# Patient Record
Sex: MALE | Race: WHITE | URBAN - METROPOLITAN AREA
[De-identification: names, ages, dates, MRNs, and addresses within clinical notes are randomized per-mention and may not be internally consistent; named-entity substitution may affect disease eponyms.]

---

## 2021-07-16 ENCOUNTER — TELEPHONE ENCOUNTER (OUTPATIENT)
Dept: URBAN - METROPOLITAN AREA CLINIC 23 | Facility: CLINIC | Age: 31
End: 2021-07-16

## 2021-07-19 ENCOUNTER — OFFICE VISIT (OUTPATIENT)
Dept: URBAN - METROPOLITAN AREA CLINIC 84 | Facility: CLINIC | Age: 31
End: 2021-07-19

## 2021-07-20 ENCOUNTER — OFFICE VISIT (OUTPATIENT)
Dept: URBAN - METROPOLITAN AREA CLINIC 25 | Facility: CLINIC | Age: 31
End: 2021-07-20
Payer: COMMERCIAL

## 2021-07-20 ENCOUNTER — WEB ENCOUNTER (OUTPATIENT)
Dept: URBAN - METROPOLITAN AREA CLINIC 25 | Facility: CLINIC | Age: 31
End: 2021-07-20

## 2021-07-20 DIAGNOSIS — R79.89 ELEVATED LFTS: ICD-10-CM

## 2021-07-20 PROCEDURE — 99244 OFF/OP CNSLTJ NEW/EST MOD 40: CPT | Performed by: INTERNAL MEDICINE

## 2021-07-21 ENCOUNTER — LAB OUTSIDE AN ENCOUNTER (OUTPATIENT)
Dept: URBAN - METROPOLITAN AREA CLINIC 84 | Facility: CLINIC | Age: 31
End: 2021-07-21

## 2021-07-22 LAB
ACTIN (SMOOTH MUSCLE) ANTIBODY: 13
ALPHA 2-MACROGLOBULINS, QN: 109
ALPHA-1-ANTITRYPSIN, SERUM: 120
ALT (SGPT) P5P: 137
ANA DIRECT: NEGATIVE
APOLIPOPROTEIN A-1: 121
AST (SGOT) P5P: 41
BILIRUBIN, TOTAL: 0.3
CERULOPLASMIN: 19.4
CHOLESTEROL, TOTAL: 230
COMMENT:: (no result)
DEAMIDATED GLIADIN ABS, IGA: 8
DEAMIDATED GLIADIN ABS, IGG: 5
ENDOMYSIAL ANTIBODY IGA: NEGATIVE
FERRITIN, SERUM: 172
FIBROSIS SCORE: 0.04
FIBROSIS SCORING:: (no result)
FIBROSIS STAGE: (no result)
GGT: 36
GLUCOSE, SERUM: 98
HAPTOGLOBIN: 157
HEIGHT:: 75
IMMUNOGLOBULIN A, QN, SERUM: 224
INTERPRETATIONS:: (no result)
IRON BIND.CAP.(TIBC): 294
IRON SATURATION: 20
IRON: 59
LIMITATIONS:: (no result)
MITOCHONDRIAL (M2) ANTIBODY: <20
NASH GRADE: (no result)
NASH SCORE: 0.5
NASH SCORING: (no result)
STEATOSIS GRADE: (no result)
STEATOSIS GRADING: (no result)
STEATOSIS SCORE: 0.76
T-TRANSGLUTAMINASE (TTG) IGA: <2
T-TRANSGLUTAMINASE (TTG) IGG: 4
T4,FREE(DIRECT): 1.72
TRIGLYCERIDES: 174
TSH: 0.62
UIBC: 235
WEIGHT:: 226

## 2021-07-26 ENCOUNTER — TELEPHONE ENCOUNTER (OUTPATIENT)
Dept: URBAN - METROPOLITAN AREA CLINIC 25 | Facility: CLINIC | Age: 31
End: 2021-07-26

## 2021-08-04 ENCOUNTER — WEB ENCOUNTER (OUTPATIENT)
Dept: URBAN - METROPOLITAN AREA CLINIC 25 | Facility: CLINIC | Age: 31
End: 2021-08-04

## 2021-08-04 ENCOUNTER — DASHBOARD ENCOUNTERS (OUTPATIENT)
Age: 31
End: 2021-08-04

## 2021-08-04 ENCOUNTER — OFFICE VISIT (OUTPATIENT)
Dept: URBAN - METROPOLITAN AREA CLINIC 25 | Facility: CLINIC | Age: 31
End: 2021-08-04
Payer: COMMERCIAL

## 2021-08-04 DIAGNOSIS — R79.89 ELEVATED LFTS: ICD-10-CM

## 2021-08-04 PROCEDURE — 99213 OFFICE O/P EST LOW 20 MIN: CPT | Performed by: INTERNAL MEDICINE

## 2021-08-05 LAB
ALBUMIN: 4.9
ALKALINE PHOSPHATASE: 52
ALT (SGPT): 82
AST (SGOT): 25
BILIRUBIN, DIRECT: 0.13
BILIRUBIN, TOTAL: 0.5
PROTEIN, TOTAL: 7.1

## 2021-12-14 ENCOUNTER — OFFICE VISIT (OUTPATIENT)
Dept: URBAN - METROPOLITAN AREA CLINIC 25 | Facility: CLINIC | Age: 31
End: 2021-12-14

## 2023-09-18 NOTE — HPI-TODAY'S VISIT:
The patient was referred by Dr. Alberto Stafford for elevated LFT's .   A copy of this document is being forwarded to the referring provider.  About 6 weeks ago he developed rash and hives.  This resolved after a few days.  He then developed a low grade fever.  He has a daily fever.  He has ha left sided abdominal pain.  It gets worse wth certain positions.  He has a decreased appetite and he has lost about 15 pounds.  He denies GERD/N/V/dysphagia.  He denies LGI symptoms.  He denies LGI bleed or melena.  A few weeks before the symptoms started he thoght that he had a tick bite.  Dr. Stafford treated him for 21 days with antibiotics.  Per the patient he had a negative Hep serology.  He has negative CMV/EBV/tick borne diseasse.  He was negative for COVID.  He was not been checked for HIV.  he had mildly elevated LFT's, ALT> AST.  Alk phs and Bili are normal.  CT Scan was normal.  He denies alcohol.  He does not take any medications.  Per the patientt he dodes have family members with Hemachrmatosis
(4) walks frequently

## 2023-12-15 ENCOUNTER — OFFICE VISIT (OUTPATIENT)
Dept: URBAN - METROPOLITAN AREA CLINIC 46 | Facility: CLINIC | Age: 33
End: 2023-12-15